# Patient Record
Sex: MALE | Race: WHITE | NOT HISPANIC OR LATINO | Employment: FULL TIME | URBAN - METROPOLITAN AREA
[De-identification: names, ages, dates, MRNs, and addresses within clinical notes are randomized per-mention and may not be internally consistent; named-entity substitution may affect disease eponyms.]

---

## 2018-03-07 RX ORDER — AMLODIPINE BESYLATE 10 MG/1
10 TABLET ORAL DAILY
COMMUNITY
End: 2018-08-06 | Stop reason: SDUPTHER

## 2018-03-07 RX ORDER — GLUCOSAM/CHONDRO/HERB 149/HYAL 750-100 MG
1 TABLET ORAL DAILY
COMMUNITY
End: 2018-08-06

## 2018-03-07 RX ORDER — IBUPROFEN 200 MG
400 TABLET ORAL EVERY 6 HOURS PRN
COMMUNITY
End: 2018-03-12

## 2018-03-07 RX ORDER — METOPROLOL SUCCINATE 100 MG/1
100 TABLET, EXTENDED RELEASE ORAL DAILY
COMMUNITY
End: 2018-08-06 | Stop reason: SDUPTHER

## 2018-03-07 RX ORDER — METFORMIN HYDROCHLORIDE 500 MG/1
500 TABLET ORAL 2 TIMES DAILY WITH MEALS
COMMUNITY
End: 2018-08-06

## 2018-03-07 RX ORDER — SPIRONOLACTONE 50 MG/1
50 TABLET, FILM COATED ORAL 2 TIMES DAILY
COMMUNITY
End: 2018-07-03 | Stop reason: SDUPTHER

## 2018-03-07 RX ORDER — ATORVASTATIN CALCIUM 40 MG/1
40 TABLET, FILM COATED ORAL DAILY
COMMUNITY
End: 2018-03-12

## 2018-03-07 RX ORDER — VALSARTAN AND HYDROCHLOROTHIAZIDE 320; 25 MG/1; MG/1
1 TABLET, FILM COATED ORAL DAILY
COMMUNITY
End: 2018-05-03 | Stop reason: SDUPTHER

## 2018-03-12 ENCOUNTER — OFFICE VISIT (OUTPATIENT)
Dept: INTERNAL MEDICINE | Facility: CLINIC | Age: 63
End: 2018-03-12
Payer: COMMERCIAL

## 2018-03-12 ENCOUNTER — TELEPHONE (OUTPATIENT)
Dept: INTERNAL MEDICINE | Facility: CLINIC | Age: 63
End: 2018-03-12

## 2018-03-12 VITALS
HEIGHT: 63 IN | DIASTOLIC BLOOD PRESSURE: 84 MMHG | SYSTOLIC BLOOD PRESSURE: 138 MMHG | WEIGHT: 234.38 LBS | TEMPERATURE: 97 F | RESPIRATION RATE: 16 BRPM | OXYGEN SATURATION: 97 % | BODY MASS INDEX: 41.53 KG/M2 | HEART RATE: 69 BPM

## 2018-03-12 DIAGNOSIS — E78.00 PURE HYPERCHOLESTEROLEMIA: ICD-10-CM

## 2018-03-12 DIAGNOSIS — Z12.11 ENCOUNTER FOR SCREENING COLONOSCOPY: ICD-10-CM

## 2018-03-12 DIAGNOSIS — Z11.59 NEED FOR HEPATITIS C SCREENING TEST: ICD-10-CM

## 2018-03-12 DIAGNOSIS — R97.20 RAISED PROSTATE SPECIFIC ANTIGEN: ICD-10-CM

## 2018-03-12 DIAGNOSIS — E11.9 TYPE 2 DIABETES MELLITUS WITHOUT COMPLICATION, WITHOUT LONG-TERM CURRENT USE OF INSULIN: Primary | ICD-10-CM

## 2018-03-12 DIAGNOSIS — F17.200 TOBACCO DEPENDENCE: ICD-10-CM

## 2018-03-12 DIAGNOSIS — I10 ESSENTIAL HYPERTENSION: ICD-10-CM

## 2018-03-12 PROBLEM — E78.5 HYPERLIPIDEMIA: Status: ACTIVE | Noted: 2018-03-12

## 2018-03-12 PROBLEM — I25.10 CAD (CORONARY ARTERY DISEASE): Status: ACTIVE | Noted: 2018-03-12

## 2018-03-12 PROCEDURE — 99214 OFFICE O/P EST MOD 30 MIN: CPT | Mod: ,,, | Performed by: INTERNAL MEDICINE

## 2018-03-12 RX ORDER — VARENICLINE TARTRATE 1 MG/1
1 TABLET, FILM COATED ORAL 2 TIMES DAILY
Qty: 60 TABLET | Refills: 3 | Status: SHIPPED | OUTPATIENT
Start: 2018-04-11 | End: 2018-08-06

## 2018-03-12 RX ORDER — VARENICLINE TARTRATE 0.5 (11)-1
KIT ORAL
Qty: 1 PACKAGE | Refills: 0 | Status: SHIPPED | OUTPATIENT
Start: 2018-03-12 | End: 2018-08-06

## 2018-03-12 RX ORDER — ATORVASTATIN CALCIUM 40 MG/1
40 TABLET, FILM COATED ORAL DAILY
COMMUNITY
Start: 2017-07-10 | End: 2018-08-06 | Stop reason: SDUPTHER

## 2018-03-12 NOTE — PROGRESS NOTES
Subjective:       Patient ID: Kash Salomon is a 62 y.o. male.    Chief Complaint: Diabetes (3 month follow up); Hypertension (3 month follow up); and Hyperlipidemia (3 month follow up)    Diabetes   He presents for his follow-up diabetic visit. He has type 2 diabetes mellitus. His disease course has been stable. Pertinent negatives for hypoglycemia include no confusion, dizziness, headaches, mood changes, nervousness/anxiousness, seizures, sleepiness, speech difficulty or tremors. Pertinent negatives for diabetes include no blurred vision, no chest pain, no fatigue, no foot paresthesias, no foot ulcerations, no polydipsia, no polyphagia, no polyuria, no visual change and no weight loss. There are no hypoglycemic complications. Symptoms are stable. There are no diabetic complications. Pertinent negatives for diabetic complications include no CVA. Current diabetic treatment includes oral agent (monotherapy). He is compliant with treatment all of the time. His weight is stable. Diabetic current diet: works as a  so diet difficult to maintain. He rarely participates in exercise. There is no change (only checks every few weeks) in his home blood glucose trend. An ACE inhibitor/angiotensin II receptor blocker is being taken. He does not see a podiatrist.Eye exam is not current.   Hypertension   This is a chronic problem. The problem is controlled. Pertinent negatives include no anxiety, blurred vision, chest pain, headaches, malaise/fatigue, neck pain, orthopnea, palpitations, peripheral edema, PND or shortness of breath. Past treatments include beta blockers, angiotensin blockers and diuretics. The current treatment provides moderate improvement. There are no compliance problems.  Hypertensive end-organ damage includes CAD/MI. There is no history of angina or CVA.     Review of Systems   Constitutional: Negative for activity change, appetite change, chills, diaphoresis, fatigue, fever, malaise/fatigue,  unexpected weight change and weight loss.   HENT: Positive for rhinorrhea. Negative for congestion, ear discharge, ear pain, postnasal drip, sinus pressure, sneezing, sore throat and voice change.    Eyes: Negative for blurred vision, photophobia, pain, discharge, redness, itching and visual disturbance.   Respiratory: Negative for apnea, cough, choking, chest tightness, shortness of breath and wheezing.    Cardiovascular: Negative for chest pain, palpitations, orthopnea, leg swelling and PND.   Gastrointestinal: Negative for abdominal distention, abdominal pain, blood in stool, constipation, diarrhea, nausea and vomiting.   Endocrine: Negative for polydipsia, polyphagia and polyuria.   Genitourinary: Negative for decreased urine volume, difficulty urinating, discharge, dysuria, enuresis, flank pain, frequency, genital sores, hematuria, scrotal swelling, testicular pain and urgency.   Musculoskeletal: Positive for arthralgias. Negative for back pain, gait problem, joint swelling, neck pain and neck stiffness.   Skin: Negative for color change and rash.   Neurological: Negative for dizziness, tremors, seizures, facial asymmetry, speech difficulty, light-headedness, numbness and headaches.   Hematological: Negative for adenopathy.   Psychiatric/Behavioral: Negative for behavioral problems, confusion, decreased concentration and hallucinations. The patient is not nervous/anxious.        Past Medical History:   Diagnosis Date    Chronic ischemic heart disease     Cyst, pilonidal, with abscess     Diabetes mellitus, type 2     Hyperlipidemia     Hypertension     Raised prostate specific antigen       Past Surgical History:   Procedure Laterality Date    CORONARY ARTERY BYPASS GRAFT      HERNIA REPAIR      right elbow         Family History   Problem Relation Age of Onset    Osteoporosis Mother     Arthritis Mother     Cancer Father     Scoliosis Daughter        Social History     Social History    Marital  status:      Spouse name: N/A    Number of children: N/A    Years of education: N/A     Social History Main Topics    Smoking status: Current Every Day Smoker    Smokeless tobacco: Never Used      Comment: 1 pack per week    Alcohol use No    Drug use: No    Sexual activity: Not Asked     Other Topics Concern    None     Social History Narrative    None       Current Outpatient Prescriptions   Medication Sig Dispense Refill    amLODIPine (NORVASC) 10 MG tablet Take 10 mg by mouth once daily.      atorvastatin (LIPITOR) 40 MG tablet Take 40 mg by mouth Daily.      metFORMIN (GLUCOPHAGE) 500 MG tablet Take 500 mg by mouth 2 (two) times daily with meals.      metoprolol succinate (TOPROL-XL) 100 MG 24 hr tablet Take 100 mg by mouth once daily.      omega 3-dha-epa-fish oil (FISH OIL) 1,000 mg (120 mg-180 mg) Cap Take 1 capsule by mouth once daily.      spironolactone (ALDACTONE) 50 MG tablet Take 50 mg by mouth 2 (two) times daily.      valsartan-hydrochlorothiazide (DIOVAN-HCT) 320-25 mg per tablet Take 1 tablet by mouth once daily.      varenicline (CHANTIX STARTING MONTH BOX) 0.5 mg (11)- 1 mg (42) tablet Take one 0.5mg tab by mouth once daily X3 days,then increase to one 0.5mg tab twice daily X4 days,then increase to one 1mg tab twice daily 1 Package 0    [START ON 4/11/2018] varenicline (CHANTIX) 1 mg Tab Take 1 tablet (1 mg total) by mouth 2 (two) times daily. 60 tablet 3     No current facility-administered medications for this visit.        Review of patient's allergies indicates:  No Known Allergies  Objective:    HPI     Diabetes    Additional comments: 3 month follow up           Hypertension    Additional comments: 3 month follow up           Hyperlipidemia    Additional comments: 3 month follow up       Last edited by Cyn Smith MA on 3/12/2018  8:25 AM. (History)      Blood pressure 138/84, pulse 69, temperature 97.3 °F (36.3 °C), temperature source Temporal, resp. rate 16,  "height 5' 3" (1.6 m), weight 106.3 kg (234 lb 6.4 oz), SpO2 97 %. Body mass index is 41.52 kg/m².   Physical Exam   Constitutional: He appears well-developed. No distress.   HENT:   Nose: Nose normal.   Mouth/Throat: Oropharynx is clear and moist. Abnormal dentition (missing multiple teeth).   Eyes: Conjunctivae are normal. Right eye exhibits no discharge. Left eye exhibits no discharge. No scleral icterus.   Neck: Carotid bruit is not present.   Cardiovascular: Normal rate, regular rhythm and normal heart sounds.    No murmur heard.  Pulses:       Dorsalis pedis pulses are 1+ on the right side, and 1+ on the left side.   Pulmonary/Chest: Effort normal and breath sounds normal. No respiratory distress. He has no decreased breath sounds. He has no wheezes. He has no rhonchi. He has no rales.   Abdominal: Soft. He exhibits no distension. There is no tenderness. There is no rebound and no guarding.   Musculoskeletal: He exhibits no edema.   Feet:   Right Foot:   Protective Sensation: 10 sites tested. 10 sites sensed.   Skin Integrity: Negative for ulcer, blister, skin breakdown, erythema, warmth, callus or dry skin.   Left Foot:   Protective Sensation: 10 sites tested. 10 sites sensed.   Skin Integrity: Negative for ulcer, blister, skin breakdown, erythema, warmth, callus or dry skin.   Neurological: He is alert. He displays no tremor.   Skin: Skin is warm and dry.   Psychiatric: He has a normal mood and affect. His speech is normal.   Nursing note and vitals reviewed.          Assessment:       1. Type 2 diabetes mellitus without complication, without long-term current use of insulin    2. Essential hypertension    3. Pure hypercholesterolemia    4. Raised prostate specific antigen    5. Need for hepatitis C screening test    6. Encounter for screening colonoscopy    7. Tobacco dependence        Plan:       Kash was seen today for diabetes, hypertension and hyperlipidemia.    Diagnoses and all orders for this " visit:    Type 2 diabetes mellitus without complication, without long-term current use of insulin  -     Microalbumin/creatinine urine ratio; Future  -     Urinalysis; Future  -     Lipid panel; Future  -     Comprehensive metabolic panel; Future  -     Hemoglobin A1c; Future  -     Ambulatory referral to Ophthalmology  -     Microalbumin/creatinine urine ratio  -     Urinalysis  -     Lipid panel  -     Comprehensive metabolic panel  -     Hemoglobin A1c    Essential hypertension    Pure hypercholesterolemia    Raised prostate specific antigen    Need for hepatitis C screening test  -     Hepatitis C antibody; Future  -     Hepatitis C antibody    Encounter for screening colonoscopy  -     Ambulatory consult to Gastroenterology    Tobacco dependence  -     varenicline (CHANTIX STARTING MONTH BOX) 0.5 mg (11)- 1 mg (42) tablet; Take one 0.5mg tab by mouth once daily X3 days,then increase to one 0.5mg tab twice daily X4 days,then increase to one 1mg tab twice daily  -     varenicline (CHANTIX) 1 mg Tab; Take 1 tablet (1 mg total) by mouth 2 (two) times daily.

## 2018-05-03 RX ORDER — VALSARTAN AND HYDROCHLOROTHIAZIDE 320; 25 MG/1; MG/1
TABLET, FILM COATED ORAL
Qty: 30 TABLET | Refills: 3 | Status: SHIPPED | OUTPATIENT
Start: 2018-05-03 | End: 2018-08-06 | Stop reason: ALTCHOICE

## 2018-07-03 RX ORDER — SPIRONOLACTONE 25 MG/1
50 TABLET ORAL 2 TIMES DAILY
Qty: 120 TABLET | Refills: 3 | Status: SHIPPED | OUTPATIENT
Start: 2018-07-03 | End: 2018-08-06 | Stop reason: SDUPTHER

## 2018-08-06 ENCOUNTER — OFFICE VISIT (OUTPATIENT)
Dept: INTERNAL MEDICINE | Facility: CLINIC | Age: 63
End: 2018-08-06

## 2018-08-06 VITALS
OXYGEN SATURATION: 95 % | TEMPERATURE: 98 F | SYSTOLIC BLOOD PRESSURE: 132 MMHG | BODY MASS INDEX: 35.01 KG/M2 | HEIGHT: 68 IN | HEART RATE: 75 BPM | WEIGHT: 231 LBS | DIASTOLIC BLOOD PRESSURE: 86 MMHG

## 2018-08-06 DIAGNOSIS — Z12.11 ENCOUNTER FOR SCREENING COLONOSCOPY: ICD-10-CM

## 2018-08-06 DIAGNOSIS — I10 ESSENTIAL HYPERTENSION: ICD-10-CM

## 2018-08-06 DIAGNOSIS — E78.00 PURE HYPERCHOLESTEROLEMIA: ICD-10-CM

## 2018-08-06 DIAGNOSIS — Z11.59 NEED FOR HEPATITIS C SCREENING TEST: ICD-10-CM

## 2018-08-06 DIAGNOSIS — E11.9 TYPE 2 DIABETES MELLITUS WITHOUT COMPLICATION, WITHOUT LONG-TERM CURRENT USE OF INSULIN: Primary | ICD-10-CM

## 2018-08-06 DIAGNOSIS — R97.20 ELEVATED PSA: ICD-10-CM

## 2018-08-06 LAB
BILIRUB SERPL-MCNC: NORMAL MG/DL
BLOOD, POC UA: NORMAL
GLUCOSE UR QL STRIP: NORMAL
HBA1C MFR BLD: 7.4 %
KETONES UR QL STRIP: NORMAL
LEUKOCYTE ESTERASE URINE, POC: NORMAL
NITRITE, POC UA: NORMAL
PH, POC UA: 5
PROTEIN, POC: NORMAL
SPECIFIC GRAVITY, POC UA: <=1.005
UROBILINOGEN, POC UA: NORMAL

## 2018-08-06 PROCEDURE — 99499 UNLISTED E&M SERVICE: CPT | Mod: 25,,, | Performed by: INTERNAL MEDICINE

## 2018-08-06 RX ORDER — ATORVASTATIN CALCIUM 40 MG/1
40 TABLET, FILM COATED ORAL DAILY
Qty: 90 TABLET | Refills: 1 | Status: SHIPPED | OUTPATIENT
Start: 2018-08-06 | End: 2018-11-02 | Stop reason: SDUPTHER

## 2018-08-06 RX ORDER — AMLODIPINE BESYLATE 10 MG/1
10 TABLET ORAL DAILY
Qty: 90 TABLET | Refills: 1 | Status: SHIPPED | OUTPATIENT
Start: 2018-08-06 | End: 2019-08-05 | Stop reason: SDUPTHER

## 2018-08-06 RX ORDER — OLMESARTAN MEDOXOMIL AND HYDROCHLOROTHIAZIDE 40/12.5 40; 12.5 MG/1; MG/1
1 TABLET ORAL DAILY
Qty: 90 TABLET | Refills: 1 | Status: SHIPPED | OUTPATIENT
Start: 2018-08-06 | End: 2019-08-05 | Stop reason: DRUGHIGH

## 2018-08-06 RX ORDER — METFORMIN HYDROCHLORIDE 500 MG/1
500 TABLET ORAL 2 TIMES DAILY WITH MEALS
Status: CANCELLED | OUTPATIENT
Start: 2018-08-06

## 2018-08-06 RX ORDER — VALSARTAN AND HYDROCHLOROTHIAZIDE 320; 25 MG/1; MG/1
1 TABLET, FILM COATED ORAL DAILY
Qty: 30 TABLET | Refills: 3 | Status: CANCELLED | OUTPATIENT
Start: 2018-08-06

## 2018-08-06 RX ORDER — SPIRONOLACTONE 25 MG/1
50 TABLET ORAL 2 TIMES DAILY
Qty: 120 TABLET | Refills: 3 | Status: SHIPPED | OUTPATIENT
Start: 2018-08-06 | End: 2019-08-05 | Stop reason: SDUPTHER

## 2018-08-06 RX ORDER — METOPROLOL SUCCINATE 100 MG/1
100 TABLET, EXTENDED RELEASE ORAL DAILY
Qty: 90 TABLET | Refills: 1 | Status: SHIPPED | OUTPATIENT
Start: 2018-08-06 | End: 2019-08-05 | Stop reason: SDUPTHER

## 2018-08-06 RX ORDER — METFORMIN HYDROCHLORIDE EXTENDED-RELEASE TABLETS 1000 MG/1
1000 TABLET, FILM COATED, EXTENDED RELEASE ORAL DAILY
Qty: 90 TABLET | Refills: 1 | Status: SHIPPED | OUTPATIENT
Start: 2018-08-06 | End: 2019-06-06 | Stop reason: CLARIF

## 2018-08-06 NOTE — PROGRESS NOTES
Subjective:       Patient ID: Kash Salomon is a 62 y.o. male.    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  Never had labs done.       Hypertension   This is a chronic problem. The problem is controlled. Pertinent negatives include no anxiety, blurred vision, chest pain, headaches, malaise/fatigue, neck pain, orthopnea, palpitations, peripheral edema, PND or shortness of breath. Past treatments include beta blockers, angiotensin blockers and diuretics. The current treatment provides moderate improvement. There are no compliance problems.  Hypertensive end-organ damage includes CAD/MI (s/p CABG 05/2015). There is no history of angina or CVA.   Diabetes   He presents for his follow-up diabetic visit. He has type 2 diabetes mellitus. His disease course has been stable. Pertinent negatives for hypoglycemia include no confusion, dizziness, headaches, mood changes, nervousness/anxiousness, seizures, sleepiness, speech difficulty or tremors. Pertinent negatives for diabetes include no blurred vision, no chest pain, no fatigue, no foot paresthesias, no foot ulcerations, no polydipsia, no polyphagia, no polyuria, no visual change, no weakness and no weight loss. There are no hypoglycemic complications. Symptoms are stable. There are no diabetic complications. Pertinent negatives for diabetic complications include no CVA. Current diabetic treatment includes oral agent (monotherapy). He is compliant with treatment some of the time (has only been taking one metformin per day and usually at night because he sometimes has diarrhea with it which is an issue as a ). His weight is stable. Diabetic current diet: works as a  so diet difficult to maintain. He rarely participates in exercise. There is no change in his home blood glucose trend. An ACE inhibitor/angiotensin II receptor blocker is being taken. He does not  see a podiatrist.Eye exam is current.     Review of Systems   Constitutional: Negative for chills, fatigue, fever, malaise/fatigue, unexpected weight change and weight loss.   HENT: Positive for congestion, postnasal drip, rhinorrhea and sinus pressure. Negative for hearing loss, trouble swallowing and voice change.    Eyes: Negative for blurred vision, photophobia and visual disturbance.   Respiratory: Negative for apnea, cough, choking, chest tightness, shortness of breath and wheezing.    Cardiovascular: Negative for chest pain, palpitations, orthopnea, leg swelling and PND.   Gastrointestinal: Negative for abdominal pain, blood in stool, constipation, diarrhea, nausea, rectal pain and vomiting.   Endocrine: Negative for cold intolerance, heat intolerance, polydipsia, polyphagia and polyuria.   Genitourinary: Negative for decreased urine volume, difficulty urinating, discharge, dysuria, flank pain, frequency, genital sores, hematuria, testicular pain and urgency.   Musculoskeletal: Negative for arthralgias, back pain, gait problem, joint swelling, myalgias and neck pain.   Skin: Negative for color change, rash and wound.   Allergic/Immunologic: Negative for environmental allergies and food allergies.   Neurological: Negative for dizziness, tremors, seizures, syncope, facial asymmetry, speech difficulty, weakness, light-headedness, numbness and headaches.   Hematological: Negative for adenopathy. Does not bruise/bleed easily.   Psychiatric/Behavioral: Negative for confusion, hallucinations, sleep disturbance and suicidal ideas. The patient is not nervous/anxious.        Past Medical History:   Diagnosis Date    Chronic ischemic heart disease     Cyst, pilonidal, with abscess     Diabetes mellitus, type 2     Hyperlipidemia     Hypertension     Raised prostate specific antigen       Past Surgical History:   Procedure Laterality Date    CORONARY ARTERY BYPASS GRAFT  05/2015    HERNIA REPAIR      right elbow  "        Family History   Problem Relation Age of Onset    Osteoporosis Mother     Arthritis Mother     Cancer Father     Scoliosis Daughter        Social History     Social History    Marital status:      Spouse name: N/A    Number of children: N/A    Years of education: N/A     Occupational History          Social History Main Topics    Smoking status: Current Every Day Smoker    Smokeless tobacco: Never Used      Comment: 1 pack per week    Alcohol use No    Drug use: No    Sexual activity: Not Asked     Other Topics Concern    None     Social History Narrative    LIVE WITH WIFE       Current Outpatient Prescriptions   Medication Sig Dispense Refill    amLODIPine (NORVASC) 10 MG tablet Take 1 tablet (10 mg total) by mouth once daily. 90 tablet 1    atorvastatin (LIPITOR) 40 MG tablet Take 1 tablet (40 mg total) by mouth Daily. 90 tablet 1    metoprolol succinate (TOPROL-XL) 100 MG 24 hr tablet Take 1 tablet (100 mg total) by mouth once daily. 90 tablet 1    spironolactone (ALDACTONE) 25 MG tablet Take 2 tablets (50 mg total) by mouth 2 (two) times daily. 120 tablet 3    metFORMIN (FORTAMET) 1,000 mg 24 hr tablet Take 1 tablet (1,000 mg total) by mouth once daily. 90 tablet 1    olmesartan-hydrochlorothiazide (BENICAR HCT) 40-12.5 mg Tab Take 1 tablet by mouth once daily. 90 tablet 1     No current facility-administered medications for this visit.        Review of patient's allergies indicates:  No Known Allergies  Objective:    HPI     Hypertension    Additional comments: CONTINUITY OF  CARE           Annual Exam    Additional comments: DOT PHYSICAL       Last edited by Radha Zuniga MA on 8/6/2018  9:37 AM. (History)      Blood pressure 132/86, pulse 75, temperature 98.1 °F (36.7 °C), temperature source Temporal, height 5' 3" (1.6 m), weight 104.8 kg (231 lb), SpO2 95 %. Body mass index is 40.92 kg/m².   Physical Exam   Constitutional: He appears well-developed. No " distress.   HENT:   Right Ear: Hearing normal.   Left Ear: Hearing normal.   Nose: Nose normal.   Mouth/Throat: Oropharynx is clear and moist. Abnormal dentition (missing multiple teeth).   Eyes: Conjunctivae are normal. Right eye exhibits no discharge. Left eye exhibits no discharge. No scleral icterus.   Neck: Carotid bruit is not present.   Cardiovascular: Normal rate, regular rhythm and normal heart sounds.    No murmur heard.  Pulses:       Dorsalis pedis pulses are 1+ on the right side, and 1+ on the left side.   Pulmonary/Chest: Effort normal and breath sounds normal. No respiratory distress. He has no decreased breath sounds. He has no wheezes. He has no rhonchi. He has no rales.   Abdominal: Soft. He exhibits no distension. There is no tenderness. There is no rebound and no guarding.   Musculoskeletal: He exhibits no edema.   Feet:   Right Foot:   Protective Sensation: 10 sites tested. 10 sites sensed.   Skin Integrity: Negative for ulcer, blister, skin breakdown, erythema, warmth, callus or dry skin.   Left Foot:   Protective Sensation: 10 sites tested. 10 sites sensed.   Skin Integrity: Negative for ulcer, blister, skin breakdown, erythema, warmth, callus or dry skin.   Neurological: He is alert. He has normal reflexes. He displays no tremor.   Skin: Skin is warm and dry.   Psychiatric: He has a normal mood and affect. His speech is normal.   Nursing note and vitals reviewed.          Assessment:       1. Type 2 diabetes mellitus without complication, without long-term current use of insulin    2. Essential hypertension    3. Pure hypercholesterolemia    4. Need for hepatitis C screening test    5. Encounter for screening colonoscopy    6. Elevated PSA        Plan:       Kash was seen today for hypertension, diabetes and annual exam.    Diagnoses and all orders for this visit:    Type 2 diabetes mellitus without complication, without long-term current use of insulin  Comments:  Will switch on metformin  ER which often has less GI side effects  Orders:  -     Hemoglobin A1C, POCT  -     Comprehensive metabolic panel; Future  -     Lipid panel; Future  -     Microalbumin/creatinine urine ratio; Future  -     metFORMIN (FORTAMET) 1,000 mg 24 hr tablet; Take 1 tablet (1,000 mg total) by mouth once daily.  -     Comprehensive metabolic panel  -     Lipid panel  -     Microalbumin/creatinine urine ratio    Essential hypertension  Comments:  Will switch ARBs d/t diovan recall    Orders:  -     amLODIPine (NORVASC) 10 MG tablet; Take 1 tablet (10 mg total) by mouth once daily.  -     metoprolol succinate (TOPROL-XL) 100 MG 24 hr tablet; Take 1 tablet (100 mg total) by mouth once daily.  -     spironolactone (ALDACTONE) 25 MG tablet; Take 2 tablets (50 mg total) by mouth 2 (two) times daily.  -     POCT Urinalysis  -     olmesartan-hydrochlorothiazide (BENICAR HCT) 40-12.5 mg Tab; Take 1 tablet by mouth once daily.    Pure hypercholesterolemia  -     atorvastatin (LIPITOR) 40 MG tablet; Take 1 tablet (40 mg total) by mouth Daily.    Need for hepatitis C screening test  -     Hepatitis C antibody; Future  -     Hepatitis C antibody    Encounter for screening colonoscopy  -     Ambulatory consult to Gastroenterology    Elevated PSA  -     Prostate Specific Antigen, Diagnostic; Future  -     Prostate Specific Antigen, Diagnostic    Other orders  -     Cancel: metFORMIN (GLUCOPHAGE) 500 MG tablet; Take 1 tablet (500 mg total) by mouth 2 (two) times daily with meals.  -     Cancel: valsartan-hydrochlorothiazide (DIOVAN-HCT) 320-25 mg per tablet; Take 1 tablet by mouth once daily.

## 2018-08-07 ENCOUNTER — TELEPHONE (OUTPATIENT)
Dept: INTERNAL MEDICINE | Facility: CLINIC | Age: 63
End: 2018-08-07

## 2018-08-07 LAB
ALBUMIN SERPL-MCNC: 4.4 G/DL (ref 3.6–4.8)
ALBUMIN/GLOB SERPL: 1.4 {RATIO} (ref 1.2–2.2)
ALP SERPL-CCNC: 89 IU/L (ref 39–117)
ALT SERPL-CCNC: 33 IU/L (ref 0–44)
AST SERPL-CCNC: 24 IU/L (ref 0–40)
BILIRUB SERPL-MCNC: 0.6 MG/DL (ref 0–1.2)
BUN SERPL-MCNC: 26 MG/DL (ref 8–27)
BUN/CREAT SERPL: 16 (ref 10–24)
CALCIUM SERPL-MCNC: 9.3 MG/DL (ref 8.6–10.2)
CHLORIDE SERPL-SCNC: 101 MMOL/L (ref 96–106)
CHOLEST SERPL-MCNC: 104 MG/DL (ref 100–199)
CO2 SERPL-SCNC: 22 MMOL/L (ref 20–29)
CREAT SERPL-MCNC: 1.64 MG/DL (ref 0.76–1.27)
EGFR IF AFRICAN AMERICAN: 52 ML/MIN/1.73
EST. GFR  (NON AFRICAN AMERICAN): 45 ML/MIN/1.73
GLOBULIN SER CALC-MCNC: 3.2 G/DL (ref 1.5–4.5)
GLUCOSE SERPL-MCNC: 150 MG/DL (ref 65–99)
HCV AB S/CO SERPL IA: 0.1 S/CO RATIO (ref 0–0.9)
HDLC SERPL-MCNC: 24 MG/DL
LDLC SERPL CALC-MCNC: 61 MG/DL (ref 0–99)
POTASSIUM SERPL-SCNC: 5.3 MMOL/L (ref 3.5–5.2)
PROT SERPL-MCNC: 7.6 G/DL (ref 6–8.5)
PSA SERPL-MCNC: 9.7 NG/ML (ref 0–4)
SODIUM SERPL-SCNC: 136 MMOL/L (ref 134–144)
TRIGL SERPL-MCNC: 96 MG/DL (ref 0–149)
VLDLC SERPL CALC-MCNC: 19 MG/DL (ref 5–40)

## 2018-08-07 NOTE — TELEPHONE ENCOUNTER
----- Message from Wagner Ann Jr., MD sent at 8/7/2018  8:27 AM CDT -----  Please call the patient regarding his abnormal result.  PSA still trending upward.  Recheck every 6 months.  He probably should make f/u with Urology also.  Make sure he isn't taking any potassium supplements or using salt substitutes.

## 2018-11-02 DIAGNOSIS — E78.00 PURE HYPERCHOLESTEROLEMIA: ICD-10-CM

## 2018-11-05 RX ORDER — ATORVASTATIN CALCIUM 40 MG/1
TABLET, FILM COATED ORAL
Qty: 90 TABLET | Refills: 0 | Status: SHIPPED | OUTPATIENT
Start: 2018-11-05 | End: 2019-08-05 | Stop reason: SDUPTHER

## 2019-06-03 ENCOUNTER — TELEPHONE (OUTPATIENT)
Dept: FAMILY MEDICINE | Facility: CLINIC | Age: 64
End: 2019-06-03

## 2019-06-03 RX ORDER — METFORMIN HYDROCHLORIDE 500 MG/1
TABLET ORAL
Qty: 180 TABLET | Refills: 0 | OUTPATIENT
Start: 2019-06-03

## 2019-06-03 NOTE — TELEPHONE ENCOUNTER
The following medication needs a prior authorization:     Medication Name: metformin er    Dosage: 1,000 mg    Frequency: take one tablet daily    Directions for use: take one tablet daily.    Diagnosis:     Is the request for a reauthorization? New therapy    Is the patient currently stable on therapy? New therapy    Please list all therapeutic alternatives previously used with start/end dates and outcome:  Metformin 500 mg two times daily

## 2019-06-03 NOTE — TELEPHONE ENCOUNTER
----- Message from Yasemin Overton sent at 6/2/2019 12:28 PM CDT -----  PRIOR AUTHORIZATION, 6/1/19

## 2019-06-05 ENCOUNTER — TELEPHONE (OUTPATIENT)
Dept: FAMILY MEDICINE | Facility: CLINIC | Age: 64
End: 2019-06-05

## 2019-06-06 ENCOUNTER — TELEPHONE (OUTPATIENT)
Dept: FAMILY MEDICINE | Facility: CLINIC | Age: 64
End: 2019-06-06

## 2019-06-06 DIAGNOSIS — E11.9 TYPE 2 DIABETES MELLITUS WITHOUT COMPLICATION, WITHOUT LONG-TERM CURRENT USE OF INSULIN: Primary | ICD-10-CM

## 2019-06-06 RX ORDER — METFORMIN HYDROCHLORIDE 500 MG/1
1000 TABLET, EXTENDED RELEASE ORAL
Qty: 180 TABLET | Refills: 1 | Status: SHIPPED | OUTPATIENT
Start: 2019-06-06 | End: 2019-08-05 | Stop reason: SDUPTHER

## 2019-06-06 NOTE — TELEPHONE ENCOUNTER
----- Message from Wagner Ann Jr., MD sent at 6/6/2019 10:39 AM CDT -----  Insurance didn't cover the long acting metformin that I sent so I am trying a different one

## 2019-06-25 ENCOUNTER — TELEPHONE (OUTPATIENT)
Dept: FAMILY MEDICINE | Facility: CLINIC | Age: 64
End: 2019-06-25

## 2019-06-25 NOTE — TELEPHONE ENCOUNTER
HM UPDATE: LEFT MESSAGE FOR PATIENT TO CALL CLINIC ABOUT ANTITHROMBOIC  WITH ISCHEMIC       VASCULAR DISEASE, ASPIRIN THERAPY AND DIABETIC EYE SCREENING.

## 2019-08-02 DIAGNOSIS — E78.00 PURE HYPERCHOLESTEROLEMIA: ICD-10-CM

## 2019-08-02 DIAGNOSIS — I10 ESSENTIAL HYPERTENSION: ICD-10-CM

## 2019-08-05 ENCOUNTER — OFFICE VISIT (OUTPATIENT)
Dept: FAMILY MEDICINE | Facility: CLINIC | Age: 64
End: 2019-08-05
Payer: COMMERCIAL

## 2019-08-05 ENCOUNTER — TELEPHONE (OUTPATIENT)
Dept: FAMILY MEDICINE | Facility: CLINIC | Age: 64
End: 2019-08-05

## 2019-08-05 VITALS
OXYGEN SATURATION: 95 % | DIASTOLIC BLOOD PRESSURE: 82 MMHG | SYSTOLIC BLOOD PRESSURE: 152 MMHG | TEMPERATURE: 99 F | BODY MASS INDEX: 34.25 KG/M2 | HEART RATE: 78 BPM | RESPIRATION RATE: 14 BRPM | HEIGHT: 68 IN | WEIGHT: 226 LBS

## 2019-08-05 DIAGNOSIS — Z02.4 ENCOUNTER FOR DRIVER'S LICENSE HISTORY AND PHYSICAL: ICD-10-CM

## 2019-08-05 DIAGNOSIS — E78.00 PURE HYPERCHOLESTEROLEMIA: ICD-10-CM

## 2019-08-05 DIAGNOSIS — I10 ESSENTIAL HYPERTENSION: ICD-10-CM

## 2019-08-05 DIAGNOSIS — E11.9 TYPE 2 DIABETES MELLITUS WITHOUT COMPLICATION, WITHOUT LONG-TERM CURRENT USE OF INSULIN: ICD-10-CM

## 2019-08-05 DIAGNOSIS — I25.10 CORONARY ARTERY DISEASE, ANGINA PRESENCE UNSPECIFIED, UNSPECIFIED VESSEL OR LESION TYPE, UNSPECIFIED WHETHER NATIVE OR TRANSPLANTED HEART: ICD-10-CM

## 2019-08-05 DIAGNOSIS — E11.9 TYPE 2 DIABETES MELLITUS WITHOUT COMPLICATION, WITHOUT LONG-TERM CURRENT USE OF INSULIN: Primary | ICD-10-CM

## 2019-08-05 DIAGNOSIS — R97.20 RAISED PROSTATE SPECIFIC ANTIGEN: ICD-10-CM

## 2019-08-05 LAB — HBA1C MFR BLD: ABNORMAL %

## 2019-08-05 PROCEDURE — 83036 POCT HEMOGLOBIN A1C: ICD-10-PCS | Mod: QW,,, | Performed by: INTERNAL MEDICINE

## 2019-08-05 PROCEDURE — 99214 PR OFFICE/OUTPT VISIT, EST, LEVL IV, 30-39 MIN: ICD-10-PCS | Mod: S$GLB,,, | Performed by: INTERNAL MEDICINE

## 2019-08-05 PROCEDURE — 99214 OFFICE O/P EST MOD 30 MIN: CPT | Mod: S$GLB,,, | Performed by: INTERNAL MEDICINE

## 2019-08-05 PROCEDURE — 83036 HEMOGLOBIN GLYCOSYLATED A1C: CPT | Mod: QW,,, | Performed by: INTERNAL MEDICINE

## 2019-08-05 RX ORDER — SPIRONOLACTONE 50 MG/1
50 TABLET, FILM COATED ORAL DAILY
Qty: 90 TABLET | Refills: 1 | Status: SHIPPED | OUTPATIENT
Start: 2019-08-05 | End: 2020-05-29

## 2019-08-05 RX ORDER — METFORMIN HYDROCHLORIDE 1000 MG/1
1 TABLET ORAL 2 TIMES DAILY
COMMUNITY
End: 2019-08-05 | Stop reason: ALTCHOICE

## 2019-08-05 RX ORDER — OLMESARTAN MEDOXOMIL AND HYDROCHLOROTHIAZIDE 40/12.5 40; 12.5 MG/1; MG/1
TABLET ORAL
Qty: 90 TABLET | Refills: 0 | OUTPATIENT
Start: 2019-08-05

## 2019-08-05 RX ORDER — ASPIRIN 325 MG
325 TABLET ORAL DAILY
COMMUNITY

## 2019-08-05 RX ORDER — METFORMIN HYDROCHLORIDE 500 MG/1
1000 TABLET, EXTENDED RELEASE ORAL
Qty: 180 TABLET | Refills: 1 | Status: SHIPPED | OUTPATIENT
Start: 2019-08-05 | End: 2020-03-18 | Stop reason: ALTCHOICE

## 2019-08-05 RX ORDER — OLMESARTAN MEDOXOMIL AND HYDROCHLOROTHIAZIDE 40/25 40; 25 MG/1; MG/1
1 TABLET ORAL DAILY
Qty: 90 TABLET | Refills: 1 | Status: SHIPPED | OUTPATIENT
Start: 2019-08-05 | End: 2020-05-29

## 2019-08-05 RX ORDER — ATORVASTATIN CALCIUM 40 MG/1
40 TABLET, FILM COATED ORAL DAILY
Qty: 90 TABLET | Refills: 1 | Status: SHIPPED | OUTPATIENT
Start: 2019-08-05 | End: 2020-05-29

## 2019-08-05 RX ORDER — METOPROLOL SUCCINATE 100 MG/1
100 TABLET, EXTENDED RELEASE ORAL DAILY
Qty: 90 TABLET | Refills: 1 | Status: SHIPPED | OUTPATIENT
Start: 2019-08-05 | End: 2020-05-29

## 2019-08-05 RX ORDER — METOPROLOL SUCCINATE 100 MG/1
TABLET, EXTENDED RELEASE ORAL
Qty: 90 TABLET | Refills: 0 | OUTPATIENT
Start: 2019-08-05

## 2019-08-05 RX ORDER — ATORVASTATIN CALCIUM 40 MG/1
TABLET, FILM COATED ORAL
Qty: 90 TABLET | Refills: 0 | OUTPATIENT
Start: 2019-08-05

## 2019-08-05 RX ORDER — AMLODIPINE BESYLATE 10 MG/1
10 TABLET ORAL NIGHTLY
Qty: 90 TABLET | Refills: 1 | Status: SHIPPED | OUTPATIENT
Start: 2019-08-05 | End: 2020-07-20 | Stop reason: SDUPTHER

## 2019-08-05 NOTE — PROGRESS NOTES
Subjective:       Patient ID: Kash Salomon is a 63 y.o. male.    Chief Complaint: Annual Exam (DOT physical); Diabetes; and Hypertension    Here for routine f/u and DOT physical. Hasn't had labs since last year.    Hypertension   This is a chronic problem. Condition status: 120s-130s/70s-80s at home. Pertinent negatives include no anxiety, blurred vision, chest pain, headaches, malaise/fatigue, neck pain, orthopnea, palpitations, peripheral edema, PND or shortness of breath. Past treatments include beta blockers, angiotensin blockers, diuretics and calcium channel blockers. The current treatment provides moderate improvement. Compliance problems include medication side effects (has only been taking aldactone once daily d/t excess urination).  Hypertensive end-organ damage includes CAD/MI (s/p CABG 05/2015). There is no history of angina or CVA.   Diabetes   He presents for his follow-up diabetic visit. He has type 2 diabetes mellitus. His disease course has been stable. Pertinent negatives for hypoglycemia include no confusion, dizziness, headaches, mood changes, nervousness/anxiousness, seizures, sleepiness, speech difficulty or tremors. Pertinent negatives for diabetes include no blurred vision, no chest pain, no fatigue, no foot paresthesias, no foot ulcerations, no polydipsia, no polyphagia, no polyuria, no visual change, no weakness and no weight loss. There are no hypoglycemic complications. Symptoms are stable. There are no diabetic complications. Pertinent negatives for diabetic complications include no CVA. Current diabetic treatment includes oral agent (monotherapy). He is compliant with treatment some of the time (has only been taking one metformin per day and usually at night because he sometimes has diarrhea with it which is an issue as a ). Weight trend: down 5 pounds. Diabetic current diet: works as a  so diet difficult to maintain. He rarely participates in exercise. He  monitors urine at home 1-2 x per week. There is no change in his home blood glucose trend. An ACE inhibitor/angiotensin II receptor blocker is being taken. He does not see a podiatrist.Eye exam is not current (he has appt scheduled).     Review of Systems   Constitutional: Negative for chills, fatigue, fever, malaise/fatigue, unexpected weight change and weight loss.   HENT: Negative for congestion, hearing loss, postnasal drip, rhinorrhea, trouble swallowing and voice change.    Eyes: Negative for blurred vision, photophobia and visual disturbance.   Respiratory: Negative for apnea, cough, choking, chest tightness, shortness of breath and wheezing.    Cardiovascular: Negative for chest pain, palpitations, orthopnea, leg swelling and PND.   Gastrointestinal: Negative for abdominal pain, blood in stool, constipation, diarrhea, nausea, rectal pain and vomiting.   Endocrine: Negative for cold intolerance, heat intolerance, polydipsia, polyphagia and polyuria.   Genitourinary: Negative for decreased urine volume, difficulty urinating, discharge, dysuria, flank pain, frequency, genital sores, hematuria, testicular pain and urgency.   Musculoskeletal: Positive for arthralgias. Negative for back pain, gait problem, joint swelling, myalgias and neck pain.   Skin: Negative for color change, rash and wound.   Allergic/Immunologic: Negative for environmental allergies and food allergies.   Neurological: Negative for dizziness, tremors, seizures, syncope, facial asymmetry, speech difficulty, weakness, light-headedness, numbness and headaches.   Hematological: Negative for adenopathy. Does not bruise/bleed easily.   Psychiatric/Behavioral: Negative for confusion, hallucinations, sleep disturbance and suicidal ideas. The patient is not nervous/anxious.        Past Medical History:   Diagnosis Date    Chronic ischemic heart disease     Cyst, pilonidal, with abscess     Diabetes mellitus, type 2     Hyperlipidemia      Hypertension     Raised prostate specific antigen       Past Surgical History:   Procedure Laterality Date    CORONARY ARTERY BYPASS GRAFT  05/2015    HERNIA REPAIR      right elbow         Family History   Problem Relation Age of Onset    Osteoporosis Mother     Arthritis Mother     Cancer Father     Scoliosis Daughter        Social History     Socioeconomic History    Marital status:      Spouse name: Not on file    Number of children: Not on file    Years of education: Not on file    Highest education level: Not on file   Occupational History    Occupation:    Social Needs    Financial resource strain: Not on file    Food insecurity:     Worry: Not on file     Inability: Not on file    Transportation needs:     Medical: Not on file     Non-medical: Not on file   Tobacco Use    Smoking status: Current Every Day Smoker    Smokeless tobacco: Never Used    Tobacco comment: 1 pack per week   Substance and Sexual Activity    Alcohol use: No    Drug use: No    Sexual activity: Not on file   Lifestyle    Physical activity:     Days per week: Not on file     Minutes per session: Not on file    Stress: Not on file   Relationships    Social connections:     Talks on phone: Not on file     Gets together: Not on file     Attends Islam service: Not on file     Active member of club or organization: Not on file     Attends meetings of clubs or organizations: Not on file     Relationship status: Not on file   Other Topics Concern    Not on file   Social History Narrative    LIVE WITH WIFE       Current Outpatient Medications   Medication Sig Dispense Refill    amLODIPine (NORVASC) 10 MG tablet Take 1 tablet (10 mg total) by mouth every evening. 90 tablet 1    aspirin 325 MG tablet Take 325 mg by mouth once daily.      atorvastatin (LIPITOR) 40 MG tablet Take 1 tablet (40 mg total) by mouth once daily. 90 tablet 1    metFORMIN (GLUCOPHAGE-XR) 500 MG 24 hr tablet Take 2 tablets  "(1,000 mg total) by mouth daily with breakfast. 180 tablet 1    metoprolol succinate (TOPROL-XL) 100 MG 24 hr tablet Take 1 tablet (100 mg total) by mouth once daily. 90 tablet 1    olmesartan-hydrochlorothiazide (BENICAR HCT) 40-25 mg per tablet Take 1 tablet by mouth once daily. 90 tablet 1    semaglutide (OZEMPIC) 0.25 mg or 0.5 mg(2 mg/1.5 mL) PnIj Inject 0.25 mg into the skin every 7 days for 60 days, THEN 0.5 mg every 7 days. 1.5 mL 3    spironolactone (ALDACTONE) 50 MG tablet Take 1 tablet (50 mg total) by mouth once daily. 90 tablet 1     No current facility-administered medications for this visit.        Review of patient's allergies indicates:  No Known Allergies  Objective:    HPI     Annual Exam      Additional comments: DOT physical          Last edited by Wagner Ann Jr., MD on 8/5/2019  2:17 PM. (History)      Blood pressure (!) 152/82, pulse 78, temperature 98.7 °F (37.1 °C), temperature source Oral, resp. rate 14, height 5' 8" (1.727 m), weight 102.5 kg (226 lb), SpO2 95 %. Body mass index is 34.36 kg/m².   Physical Exam   Constitutional: He appears well-developed. No distress.   HENT:   Right Ear: Hearing normal.   Left Ear: Hearing normal.   Nose: Nose normal.   Mouth/Throat: Oropharynx is clear and moist. Abnormal dentition (missing multiple teeth).   Eyes: Conjunctivae are normal. Right eye exhibits no discharge. Left eye exhibits no discharge. No scleral icterus.   Neck: Carotid bruit is not present.   Cardiovascular: Normal rate, regular rhythm and normal heart sounds.   No murmur heard.  Pulses:       Dorsalis pedis pulses are 1+ on the right side, and 1+ on the left side.   Pulmonary/Chest: Effort normal and breath sounds normal. No respiratory distress. He has no decreased breath sounds. He has no wheezes. He has no rhonchi. He has no rales.   Abdominal: Soft. He exhibits no distension. There is no tenderness. There is no rebound and no guarding.   Musculoskeletal: He exhibits no " edema.   Feet:   Right Foot:   Protective Sensation: 10 sites tested. 10 sites sensed.   Skin Integrity: Negative for ulcer, blister, skin breakdown, erythema, warmth, callus or dry skin.   Left Foot:   Protective Sensation: 10 sites tested. 10 sites sensed.   Skin Integrity: Negative for ulcer, blister, skin breakdown, erythema, warmth, callus or dry skin.   Neurological: He is alert. He has normal reflexes. He displays no tremor.   Skin: Skin is warm and dry.   Psychiatric: He has a normal mood and affect. His speech is normal.   Nursing note and vitals reviewed.          Assessment:       1. Type 2 diabetes mellitus without complication, without long-term current use of insulin    2. Essential hypertension    3. Coronary artery disease, angina presence unspecified, unspecified vessel or lesion type, unspecified whether native or transplanted heart    4. Encounter for 's license history and physical    5. Raised prostate specific antigen    6. Essential hypertension    7. Pure hypercholesterolemia    8. Type 2 diabetes mellitus without complication, without long-term current use of insulin        Plan:       Kash was seen today for annual exam, diabetes and hypertension.    Diagnoses and all orders for this visit:    Type 2 diabetes mellitus without complication, without long-term current use of insulin  Comments:  He needs to be more compliant with labs and follow up.    Orders:  -     Comprehensive metabolic panel; Future  -     Lipid panel; Future  -     Microalbumin/creatinine urine ratio; Future  -     Urinalysis; Future  -     Hemoglobin A1C, POCT  -     Comprehensive metabolic panel  -     Lipid panel  -     Microalbumin/creatinine urine ratio  -     Urinalysis  -     atorvastatin (LIPITOR) 40 MG tablet; Take 1 tablet (40 mg total) by mouth once daily.  -     metFORMIN (GLUCOPHAGE-XR) 500 MG 24 hr tablet; Take 2 tablets (1,000 mg total) by mouth daily with breakfast.  -     semaglutide (OZEMPIC) 0.25  mg or 0.5 mg(2 mg/1.5 mL) PnIj; Inject 0.25 mg into the skin every 7 days for 60 days, THEN 0.5 mg every 7 days.    Essential hypertension  -     amLODIPine (NORVASC) 10 MG tablet; Take 1 tablet (10 mg total) by mouth every evening.  -     metoprolol succinate (TOPROL-XL) 100 MG 24 hr tablet; Take 1 tablet (100 mg total) by mouth once daily.  -     spironolactone (ALDACTONE) 50 MG tablet; Take 1 tablet (50 mg total) by mouth once daily.  -     olmesartan-hydrochlorothiazide (BENICAR HCT) 40-25 mg per tablet; Take 1 tablet by mouth once daily.    Coronary artery disease, angina presence unspecified, unspecified vessel or lesion type, unspecified whether native or transplanted heart  Comments:  Will need annual stress test for DOT physical starting next year  Orders:  -     atorvastatin (LIPITOR) 40 MG tablet; Take 1 tablet (40 mg total) by mouth once daily.    Encounter for 's license history and physical    Raised prostate specific antigen  -     PSA, total and free; Future  -     PSA, total and free    Essential hypertension  Comments:  Will switch ARBs d/t diovan recall    Orders:  -     amLODIPine (NORVASC) 10 MG tablet; Take 1 tablet (10 mg total) by mouth every evening.  -     metoprolol succinate (TOPROL-XL) 100 MG 24 hr tablet; Take 1 tablet (100 mg total) by mouth once daily.  -     spironolactone (ALDACTONE) 50 MG tablet; Take 1 tablet (50 mg total) by mouth once daily.  -     olmesartan-hydrochlorothiazide (BENICAR HCT) 40-25 mg per tablet; Take 1 tablet by mouth once daily.    Pure hypercholesterolemia  -     atorvastatin (LIPITOR) 40 MG tablet; Take 1 tablet (40 mg total) by mouth once daily.    Type 2 diabetes mellitus without complication, without long-term current use of insulin  -     Comprehensive metabolic panel; Future  -     Lipid panel; Future  -     Microalbumin/creatinine urine ratio; Future  -     Urinalysis; Future  -     Hemoglobin A1C, POCT  -     Comprehensive metabolic panel  -      Lipid panel  -     Microalbumin/creatinine urine ratio  -     Urinalysis  -     atorvastatin (LIPITOR) 40 MG tablet; Take 1 tablet (40 mg total) by mouth once daily.  -     metFORMIN (GLUCOPHAGE-XR) 500 MG 24 hr tablet; Take 2 tablets (1,000 mg total) by mouth daily with breakfast.  -     semaglutide (OZEMPIC) 0.25 mg or 0.5 mg(2 mg/1.5 mL) PnIj; Inject 0.25 mg into the skin every 7 days for 60 days, THEN 0.5 mg every 7 days.

## 2019-08-20 ENCOUNTER — TELEPHONE (OUTPATIENT)
Dept: FAMILY MEDICINE | Facility: CLINIC | Age: 64
End: 2019-08-20

## 2019-08-20 LAB
ALBUMIN SERPL-MCNC: 4.5 G/DL (ref 3.6–4.8)
ALBUMIN/CREAT UR: 34.3 MG/G CREAT (ref 0–30)
ALBUMIN/GLOB SERPL: 1.5 {RATIO} (ref 1.2–2.2)
ALP SERPL-CCNC: 97 IU/L (ref 39–117)
ALT SERPL-CCNC: 34 IU/L (ref 0–44)
APPEARANCE UR: CLEAR
AST SERPL-CCNC: 22 IU/L (ref 0–40)
BACTERIA #/AREA URNS HPF: ABNORMAL /[HPF]
BILIRUB SERPL-MCNC: 0.4 MG/DL (ref 0–1.2)
BILIRUB UR QL STRIP: NEGATIVE
BUN SERPL-MCNC: 23 MG/DL (ref 8–27)
BUN/CREAT SERPL: 13 (ref 10–24)
CALCIUM SERPL-MCNC: 9.5 MG/DL (ref 8.6–10.2)
CASTS URNS MICRO: ABNORMAL
CASTS URNS QL MICRO: PRESENT /LPF
CHLORIDE SERPL-SCNC: 103 MMOL/L (ref 96–106)
CHOLEST SERPL-MCNC: 109 MG/DL (ref 100–199)
CO2 SERPL-SCNC: 20 MMOL/L (ref 20–29)
COLOR UR: YELLOW
CREAT SERPL-MCNC: 1.79 MG/DL (ref 0.76–1.27)
CREAT UR-MCNC: 211.8 MG/DL
EPI CELLS #/AREA URNS HPF: ABNORMAL /HPF (ref 0–10)
GLOBULIN SER CALC-MCNC: 3.1 G/DL (ref 1.5–4.5)
GLUCOSE SERPL-MCNC: 119 MG/DL (ref 65–99)
GLUCOSE UR QL: NEGATIVE
HDLC SERPL-MCNC: 24 MG/DL
HGB UR QL STRIP: NEGATIVE
KETONES UR QL STRIP: NEGATIVE
LDLC SERPL CALC-MCNC: 67 MG/DL (ref 0–99)
LEUKOCYTE ESTERASE UR QL STRIP: NEGATIVE
MICRO URNS: ABNORMAL
MICROALBUMIN UR-MCNC: 72.7 UG/ML
MUCOUS THREADS URNS QL MICRO: PRESENT
NITRITE UR QL STRIP: NEGATIVE
PH UR STRIP: 5.5 [PH] (ref 5–7.5)
POTASSIUM SERPL-SCNC: 4.5 MMOL/L (ref 3.5–5.2)
PROT SERPL-MCNC: 7.6 G/DL (ref 6–8.5)
PROT UR QL STRIP: ABNORMAL
PSA FREE MFR SERPL: 11.1 %
PSA FREE SERPL-MCNC: 1.09 NG/ML
PSA SERPL-MCNC: 9.8 NG/ML (ref 0–4)
RBC #/AREA URNS HPF: ABNORMAL /HPF (ref 0–2)
SODIUM SERPL-SCNC: 139 MMOL/L (ref 134–144)
SP GR UR: 1.02 (ref 1–1.03)
TRIGL SERPL-MCNC: 88 MG/DL (ref 0–149)
UROBILINOGEN UR STRIP-MCNC: 0.2 MG/DL (ref 0.2–1)
VLDLC SERPL CALC-MCNC: 18 MG/DL (ref 5–40)
WBC #/AREA URNS HPF: ABNORMAL /HPF (ref 0–5)

## 2019-08-20 NOTE — TELEPHONE ENCOUNTER
----- Message from Wagner Ann Jr., MD sent at 8/20/2019  1:10 PM CDT -----  Please call the patient regarding his abnormal result.  PSA still elevated but stable.  Other labs look okay

## 2019-11-04 ENCOUNTER — OFFICE VISIT (OUTPATIENT)
Dept: FAMILY MEDICINE | Facility: CLINIC | Age: 64
End: 2019-11-04
Payer: COMMERCIAL

## 2019-11-04 VITALS
BODY MASS INDEX: 35.46 KG/M2 | HEIGHT: 68 IN | OXYGEN SATURATION: 96 % | HEART RATE: 78 BPM | TEMPERATURE: 98 F | DIASTOLIC BLOOD PRESSURE: 70 MMHG | SYSTOLIC BLOOD PRESSURE: 114 MMHG | WEIGHT: 234 LBS

## 2019-11-04 DIAGNOSIS — I25.10 CORONARY ARTERY DISEASE, ANGINA PRESENCE UNSPECIFIED, UNSPECIFIED VESSEL OR LESION TYPE, UNSPECIFIED WHETHER NATIVE OR TRANSPLANTED HEART: ICD-10-CM

## 2019-11-04 DIAGNOSIS — E11.9 TYPE 2 DIABETES MELLITUS WITHOUT COMPLICATION, WITHOUT LONG-TERM CURRENT USE OF INSULIN: Primary | ICD-10-CM

## 2019-11-04 DIAGNOSIS — I10 ESSENTIAL HYPERTENSION: ICD-10-CM

## 2019-11-04 DIAGNOSIS — Z23 NEED FOR PROPHYLACTIC VACCINATION AND INOCULATION AGAINST INFLUENZA: ICD-10-CM

## 2019-11-04 DIAGNOSIS — R97.20 RAISED PROSTATE SPECIFIC ANTIGEN: ICD-10-CM

## 2019-11-04 PROCEDURE — 90471 IMMUNIZATION ADMIN: CPT | Mod: S$GLB,,, | Performed by: INTERNAL MEDICINE

## 2019-11-04 PROCEDURE — 90471 FLU VACCINE (QUAD) GREATER THAN OR EQUAL TO 3YO PRESERVATIVE FREE IM: ICD-10-PCS | Mod: S$GLB,,, | Performed by: INTERNAL MEDICINE

## 2019-11-04 PROCEDURE — 90686 FLU VACCINE (QUAD) GREATER THAN OR EQUAL TO 3YO PRESERVATIVE FREE IM: ICD-10-PCS | Mod: S$GLB,,, | Performed by: INTERNAL MEDICINE

## 2019-11-04 PROCEDURE — 90686 IIV4 VACC NO PRSV 0.5 ML IM: CPT | Mod: S$GLB,,, | Performed by: INTERNAL MEDICINE

## 2019-11-04 PROCEDURE — 99214 OFFICE O/P EST MOD 30 MIN: CPT | Mod: 25,S$GLB,, | Performed by: INTERNAL MEDICINE

## 2019-11-04 PROCEDURE — 99214 PR OFFICE/OUTPT VISIT, EST, LEVL IV, 30-39 MIN: ICD-10-PCS | Mod: 25,S$GLB,, | Performed by: INTERNAL MEDICINE

## 2019-11-04 RX ORDER — AMOXICILLIN 500 MG
2 CAPSULE ORAL DAILY
COMMUNITY

## 2019-11-20 ENCOUNTER — TELEPHONE (OUTPATIENT)
Dept: FAMILY MEDICINE | Facility: CLINIC | Age: 64
End: 2019-11-20

## 2019-11-20 NOTE — TELEPHONE ENCOUNTER
Bnonie at [facility] called and said that she has been trying to reach patient to schedule appt. For elevated psa and has not been able to contact him.

## 2020-01-06 ENCOUNTER — TELEPHONE (OUTPATIENT)
Dept: FAMILY MEDICINE | Facility: CLINIC | Age: 65
End: 2020-01-06

## 2020-03-14 LAB
ALBUMIN SERPL-MCNC: 4.2 G/DL (ref 3.8–4.8)
ALBUMIN/GLOB SERPL: 1.4 {RATIO} (ref 1.2–2.2)
ALP SERPL-CCNC: 144 IU/L (ref 39–117)
ALT SERPL-CCNC: 27 IU/L (ref 0–44)
AST SERPL-CCNC: 18 IU/L (ref 0–40)
BILIRUB SERPL-MCNC: 0.3 MG/DL (ref 0–1.2)
BUN SERPL-MCNC: 17 MG/DL (ref 8–27)
BUN/CREAT SERPL: 11 (ref 10–24)
CALCIUM SERPL-MCNC: 10 MG/DL (ref 8.6–10.2)
CHLORIDE SERPL-SCNC: 95 MMOL/L (ref 96–106)
CO2 SERPL-SCNC: 21 MMOL/L (ref 20–29)
CREAT SERPL-MCNC: 1.49 MG/DL (ref 0.76–1.27)
GLOBULIN SER CALC-MCNC: 3 G/DL (ref 1.5–4.5)
GLUCOSE SERPL-MCNC: 504 MG/DL (ref 65–99)
HBA1C MFR BLD: 14 % (ref 4.8–5.6)
POTASSIUM SERPL-SCNC: 4.9 MMOL/L (ref 3.5–5.2)
PROT SERPL-MCNC: 7.2 G/DL (ref 6–8.5)
SODIUM SERPL-SCNC: 134 MMOL/L (ref 134–144)

## 2020-03-16 ENCOUNTER — TELEPHONE (OUTPATIENT)
Dept: FAMILY MEDICINE | Facility: CLINIC | Age: 65
End: 2020-03-16

## 2020-03-16 NOTE — TELEPHONE ENCOUNTER
----- Message from Wagner Ann Jr., MD sent at 3/16/2020  9:13 AM CDT -----  Please call the patient regarding his result.  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   We need to get him controlled or he will fail his next physical.   Truthfully, insulin is the best option and is now allowed under DOT regulations but does require him to monitor sugars regularly and keep a log.  I am going to send him an oral med to start with.  Repeat labs in 3 months.

## 2020-03-16 NOTE — TELEPHONE ENCOUNTER
The following medication needs a prior authorization:     Medication Name: dapagliflozin-saxagliptin (QTERN)    Dosage: 5-5 mg     Frequency: Take 1 tablet by mouth once daily.     Directions for use: Take 1 tablet by mouth once daily.     Diagnosis: Type 2 diabetes mellitus without complication, without long-term current use of insulin  -    Is the request for a reauthorization? NO    Is the patient currently stable on therapy? NEW THERAPY    Please list all therapeutic alternatives previously used with start/end dates and outcome:  PREFERRED DRUG GLYXAMBI IS CONTRAINDICATED DUE TO KIDNEY FUNCTION AND QTERN IS APPROVED FOR HIS KIDNEYS.

## 2020-03-17 NOTE — TELEPHONE ENCOUNTER
PATIENT CALLED BACK AND WAS NOTIFIED OF THE LABS RESULTS AND MEDICATION THAT HAS BEEN SENT IN. PATIENT STATES THAT HIS DIET HAS CHANGED AND THAT HE HAS LOST SOME WEIGHT. HIS SUGAR WAS TODAY . PATIENT STATES THAT HE WILL TRY AN INSULIN BUT HE IS STILL SCARED OF THE NEEDLES, BUT HE WILL DO WHAT YOU WANT HIM TO DO.

## 2020-03-18 DIAGNOSIS — E11.9 TYPE 2 DIABETES MELLITUS WITHOUT COMPLICATION, WITHOUT LONG-TERM CURRENT USE OF INSULIN: Primary | ICD-10-CM

## 2020-03-18 NOTE — Clinical Note
I had to change the medication I wanted to use to something his insurance covers.  Once he picks up the new meds, he needs to stop the metformin

## 2020-03-19 ENCOUNTER — TELEPHONE (OUTPATIENT)
Dept: FAMILY MEDICINE | Facility: CLINIC | Age: 65
End: 2020-03-19

## 2020-03-19 NOTE — TELEPHONE ENCOUNTER
The following medication needs a prior authorization:     Medication Name: INVOKANA    Dosage: 100 MG    Frequency: Take 1 tablet (100 mg total) by mouth once daily    Directions for use: Take 1 tablet (100 mg total) by mouth once daily    Diagnosis:Type 2 diabetes mellitus without complication, without long-term current use of insulin       Is the request for a reauthorization? NEW THERAPY    Is the patient currently stable on therapy? NEW THERAPY    Please list all therapeutic alternatives previously used with start/end dates and outcome:

## 2020-05-28 DIAGNOSIS — E11.9 TYPE 2 DIABETES MELLITUS WITHOUT COMPLICATION, WITHOUT LONG-TERM CURRENT USE OF INSULIN: ICD-10-CM

## 2020-05-28 DIAGNOSIS — I25.10 CORONARY ARTERY DISEASE, ANGINA PRESENCE UNSPECIFIED, UNSPECIFIED VESSEL OR LESION TYPE, UNSPECIFIED WHETHER NATIVE OR TRANSPLANTED HEART: ICD-10-CM

## 2020-05-28 DIAGNOSIS — E78.00 PURE HYPERCHOLESTEROLEMIA: ICD-10-CM

## 2020-05-28 DIAGNOSIS — I10 ESSENTIAL HYPERTENSION: ICD-10-CM

## 2020-05-29 RX ORDER — OLMESARTAN MEDOXOMIL AND HYDROCHLOROTHIAZIDE 40/25 40; 25 MG/1; MG/1
TABLET ORAL
Qty: 90 TABLET | Refills: 0 | Status: SHIPPED | OUTPATIENT
Start: 2020-05-29 | End: 2020-07-20 | Stop reason: SDUPTHER

## 2020-05-29 RX ORDER — ATORVASTATIN CALCIUM 40 MG/1
TABLET, FILM COATED ORAL
Qty: 90 TABLET | Refills: 0 | Status: SHIPPED | OUTPATIENT
Start: 2020-05-29 | End: 2022-01-11 | Stop reason: SDUPTHER

## 2020-05-29 RX ORDER — METOPROLOL SUCCINATE 100 MG/1
TABLET, EXTENDED RELEASE ORAL
Qty: 90 TABLET | Refills: 0 | Status: SHIPPED | OUTPATIENT
Start: 2020-05-29 | End: 2020-07-20 | Stop reason: SDUPTHER

## 2020-05-29 RX ORDER — SPIRONOLACTONE 50 MG/1
TABLET, FILM COATED ORAL
Qty: 90 TABLET | Refills: 0 | Status: SHIPPED | OUTPATIENT
Start: 2020-05-29 | End: 2020-07-20 | Stop reason: SDUPTHER

## 2020-06-02 ENCOUNTER — TELEPHONE (OUTPATIENT)
Dept: FAMILY MEDICINE | Facility: CLINIC | Age: 65
End: 2020-06-02

## 2020-06-24 ENCOUNTER — TELEPHONE (OUTPATIENT)
Dept: FAMILY MEDICINE | Facility: CLINIC | Age: 65
End: 2020-06-24

## 2020-06-24 DIAGNOSIS — E11.9 TYPE 2 DIABETES MELLITUS WITHOUT COMPLICATION, WITHOUT LONG-TERM CURRENT USE OF INSULIN: Primary | ICD-10-CM

## 2020-07-08 ENCOUNTER — TELEPHONE (OUTPATIENT)
Dept: FAMILY MEDICINE | Facility: CLINIC | Age: 65
End: 2020-07-08

## 2020-07-14 LAB
ALBUMIN SERPL-MCNC: 4.5 G/DL (ref 3.8–4.8)
ALBUMIN/GLOB SERPL: 1.5 {RATIO} (ref 1.2–2.2)
ALP SERPL-CCNC: 87 IU/L (ref 39–117)
ALT SERPL-CCNC: 38 IU/L (ref 0–44)
AST SERPL-CCNC: 19 IU/L (ref 0–40)
BILIRUB SERPL-MCNC: 0.3 MG/DL (ref 0–1.2)
BUN SERPL-MCNC: 19 MG/DL (ref 8–27)
BUN/CREAT SERPL: 15 (ref 10–24)
CALCIUM SERPL-MCNC: 10.1 MG/DL (ref 8.6–10.2)
CHLORIDE SERPL-SCNC: 103 MMOL/L (ref 96–106)
CO2 SERPL-SCNC: 23 MMOL/L (ref 20–29)
CREAT SERPL-MCNC: 1.3 MG/DL (ref 0.76–1.27)
GLOBULIN SER CALC-MCNC: 3 G/DL (ref 1.5–4.5)
GLUCOSE SERPL-MCNC: 131 MG/DL (ref 65–99)
HBA1C MFR BLD: 7 % (ref 4.8–5.6)
POTASSIUM SERPL-SCNC: 4.6 MMOL/L (ref 3.5–5.2)
PROT SERPL-MCNC: 7.5 G/DL (ref 6–8.5)
SODIUM SERPL-SCNC: 140 MMOL/L (ref 134–144)

## 2020-07-20 ENCOUNTER — OFFICE VISIT (OUTPATIENT)
Dept: FAMILY MEDICINE | Facility: CLINIC | Age: 65
End: 2020-07-20
Payer: COMMERCIAL

## 2020-07-20 VITALS
OXYGEN SATURATION: 96 % | SYSTOLIC BLOOD PRESSURE: 144 MMHG | BODY MASS INDEX: 32.28 KG/M2 | WEIGHT: 213 LBS | HEIGHT: 68 IN | HEART RATE: 76 BPM | DIASTOLIC BLOOD PRESSURE: 70 MMHG | TEMPERATURE: 98 F

## 2020-07-20 DIAGNOSIS — I10 ESSENTIAL HYPERTENSION: ICD-10-CM

## 2020-07-20 DIAGNOSIS — I25.10 ATHEROSCLEROSIS OF NATIVE CORONARY ARTERY OF NATIVE HEART WITHOUT ANGINA PECTORIS: ICD-10-CM

## 2020-07-20 DIAGNOSIS — R80.9 TYPE 2 DIABETES MELLITUS WITH MICROALBUMINURIA, WITHOUT LONG-TERM CURRENT USE OF INSULIN: Primary | ICD-10-CM

## 2020-07-20 DIAGNOSIS — E78.00 PURE HYPERCHOLESTEROLEMIA: ICD-10-CM

## 2020-07-20 DIAGNOSIS — E11.29 TYPE 2 DIABETES MELLITUS WITH MICROALBUMINURIA, WITHOUT LONG-TERM CURRENT USE OF INSULIN: Primary | ICD-10-CM

## 2020-07-20 PROCEDURE — 99214 PR OFFICE/OUTPT VISIT, EST, LEVL IV, 30-39 MIN: ICD-10-PCS | Mod: S$GLB,,, | Performed by: INTERNAL MEDICINE

## 2020-07-20 PROCEDURE — 99214 OFFICE O/P EST MOD 30 MIN: CPT | Mod: S$GLB,,, | Performed by: INTERNAL MEDICINE

## 2020-07-20 RX ORDER — METFORMIN HYDROCHLORIDE 500 MG/1
500 TABLET, EXTENDED RELEASE ORAL 2 TIMES DAILY
Qty: 180 TABLET | Refills: 1 | Status: SHIPPED | OUTPATIENT
Start: 2020-07-20 | End: 2022-01-11 | Stop reason: SDUPTHER

## 2020-07-20 RX ORDER — SPIRONOLACTONE 50 MG/1
TABLET, FILM COATED ORAL
Qty: 90 TABLET | Refills: 1 | Status: SHIPPED | OUTPATIENT
Start: 2020-07-20 | End: 2022-01-11 | Stop reason: SDUPTHER

## 2020-07-20 RX ORDER — METOPROLOL SUCCINATE 100 MG/1
TABLET, EXTENDED RELEASE ORAL
Qty: 90 TABLET | Refills: 1 | Status: SHIPPED | OUTPATIENT
Start: 2020-07-20 | End: 2022-01-11 | Stop reason: SDUPTHER

## 2020-07-20 RX ORDER — OLMESARTAN MEDOXOMIL AND HYDROCHLOROTHIAZIDE 40/25 40; 25 MG/1; MG/1
1 TABLET ORAL DAILY
Qty: 90 TABLET | Refills: 1 | Status: SHIPPED | OUTPATIENT
Start: 2020-07-20 | End: 2022-01-11

## 2020-07-20 RX ORDER — METFORMIN HYDROCHLORIDE 500 MG/1
1 TABLET, EXTENDED RELEASE ORAL 2 TIMES DAILY
COMMUNITY
Start: 2020-07-14 | End: 2020-07-20 | Stop reason: SDUPTHER

## 2020-07-20 RX ORDER — AMLODIPINE BESYLATE 10 MG/1
10 TABLET ORAL NIGHTLY
Qty: 90 TABLET | Refills: 1 | Status: SHIPPED | OUTPATIENT
Start: 2020-07-20 | End: 2022-01-11 | Stop reason: SDUPTHER
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
## 2020-07-31 ENCOUNTER — TELEPHONE (OUTPATIENT)
Dept: CARDIOLOGY | Facility: HOSPITAL | Age: 65
End: 2020-07-31

## 2020-08-03 ENCOUNTER — HOSPITAL ENCOUNTER (OUTPATIENT)
Dept: RADIOLOGY | Facility: HOSPITAL | Age: 65
Discharge: HOME | End: 2020-08-03
Attending: INTERNAL MEDICINE
Payer: COMMERCIAL

## 2020-08-03 ENCOUNTER — TELEPHONE (OUTPATIENT)
Dept: FAMILY MEDICINE | Facility: CLINIC | Age: 65
End: 2020-08-03

## 2020-08-03 ENCOUNTER — CLINICAL SUPPORT (OUTPATIENT)
Dept: CARDIOLOGY | Facility: HOSPITAL | Age: 65
End: 2020-08-03
Attending: INTERNAL MEDICINE
Payer: COMMERCIAL

## 2020-08-03 VITALS — WEIGHT: 213.88 LBS | HEIGHT: 68 IN | BODY MASS INDEX: 32.41 KG/M2

## 2020-08-03 DIAGNOSIS — I25.10 ATHEROSCLEROSIS OF NATIVE CORONARY ARTERY OF NATIVE HEART WITHOUT ANGINA PECTORIS: ICD-10-CM

## 2020-08-03 LAB
CV PHARM DOSE: 0.4 MG
CV STRESS BASE HR: 59 BPM
DIASTOLIC BLOOD PRESSURE: 71 MMHG
OHS CV CPX 85 PERCENT MAX PREDICTED HEART RATE MALE: 134
OHS CV CPX MAX PREDICTED HEART RATE: 158
OHS CV CPX PATIENT IS FEMALE: 0
OHS CV CPX PATIENT IS MALE: 1
OHS CV CPX PEAK DIASTOLIC BLOOD PRESSURE: 80 MMHG
OHS CV CPX PEAK HEAR RATE: 82 BPM
OHS CV CPX PEAK RATE PRESSURE PRODUCT: NORMAL
OHS CV CPX PEAK SYSTOLIC BLOOD PRESSURE: 158 MMHG
OHS CV CPX PERCENT MAX PREDICTED HEART RATE ACHIEVED: 52
OHS CV CPX RATE PRESSURE PRODUCT PRESENTING: 8614
SYSTOLIC BLOOD PRESSURE: 146 MMHG

## 2020-08-03 PROCEDURE — 63600175 PHARM REV CODE 636 W HCPCS: Performed by: INTERNAL MEDICINE

## 2020-08-03 PROCEDURE — 93017 CV STRESS TEST TRACING ONLY: CPT

## 2020-08-03 PROCEDURE — A9502 TC99M TETROFOSMIN: HCPCS

## 2020-08-03 RX ORDER — REGADENOSON 0.08 MG/ML
0.4 INJECTION, SOLUTION INTRAVENOUS ONCE
Status: COMPLETED | OUTPATIENT
Start: 2020-08-03 | End: 2020-08-03

## 2020-08-03 RX ADMIN — REGADENOSON 0.4 MG: 0.08 INJECTION, SOLUTION INTRAVENOUS at 08:08

## 2020-08-03 NOTE — TELEPHONE ENCOUNTER
----- Message from Wagner Ann Jr., MD sent at 8/3/2020 10:47 AM CDT -----  Please call the patient regarding his result.  No evidence of blockages but overall function/pumping ability is decreased.  Recommend follow up with Cardiology at least yearly

## 2020-08-04 ENCOUNTER — TELEPHONE (OUTPATIENT)
Dept: FAMILY MEDICINE | Facility: CLINIC | Age: 65
End: 2020-08-04

## 2020-08-04 DIAGNOSIS — I25.10 ATHEROSCLEROSIS OF NATIVE CORONARY ARTERY OF NATIVE HEART WITHOUT ANGINA PECTORIS: Primary | ICD-10-CM

## 2020-08-04 DIAGNOSIS — I51.9 SYSTOLIC DYSFUNCTION: ICD-10-CM

## 2020-08-04 NOTE — TELEPHONE ENCOUNTER
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

## 2020-08-04 NOTE — TELEPHONE ENCOUNTER
Pt states he went to [facility] today but in the chart it says cancelled but someone there told him he needs to do the second part of the stress test because of something abnormal. Again there are no notes showing he was even seen. He just wants to hear from you again what you think?. He was notified yesterday of his stress test results. Thank you!

## 2020-08-05 ENCOUNTER — TELEPHONE (OUTPATIENT)
Dept: FAMILY MEDICINE | Facility: CLINIC | Age: 65
End: 2020-08-05

## 2020-08-05 DIAGNOSIS — I25.10 ATHEROSCLEROSIS OF NATIVE CORONARY ARTERY OF NATIVE HEART WITHOUT ANGINA PECTORIS: ICD-10-CM

## 2020-08-05 DIAGNOSIS — I51.9 SYSTOLIC DYSFUNCTION: Primary | ICD-10-CM

## 2020-08-05 NOTE — TELEPHONE ENCOUNTER
Notified pt regarding cardiology referral. 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 Please advise, thank you!

## 2020-08-17 ENCOUNTER — CLINICAL SUPPORT (OUTPATIENT)
Dept: CARDIOLOGY | Facility: HOSPITAL | Age: 65
End: 2020-08-17
Attending: INTERNAL MEDICINE
Payer: COMMERCIAL

## 2020-08-17 PROCEDURE — 93306 TTE W/DOPPLER COMPLETE: CPT

## 2020-08-18 ENCOUNTER — TELEPHONE (OUTPATIENT)
Dept: FAMILY MEDICINE | Facility: CLINIC | Age: 65
End: 2020-08-18

## 2020-08-18 LAB
AORTIC ROOT ANNULUS: 3.06 CM
AORTIC VALVE CUSP SEPERATION: 2.06 CM
AV INDEX (PROSTH): 0.7
AV MEAN GRADIENT: 5 MMHG
AV PEAK GRADIENT: 8 MMHG
AV VALVE AREA: 2.32 CM2
AV VELOCITY RATIO: 74.53
CV ECHO LV RWT: 0.39 CM
DOP CALC AO PEAK VEL: 1.43 M/S
DOP CALC AO VTI: 30.69 CM
DOP CALC LVOT AREA: 3.3 CM2
DOP CALC LVOT DIAMETER: 2.05 CM
DOP CALC LVOT PEAK VEL: 106.58 M/S
DOP CALC LVOT STROKE VOLUME: 71.32 CM3
DOP CALCLVOT PEAK VEL VTI: 21.62 CM
E WAVE DECELERATION TIME: 276.76 MSEC
E/A RATIO: 1.59
E/E' RATIO: 7.74 M/S
ECHO LV POSTERIOR WALL: 1.15 CM (ref 0.6–1.1)
FRACTIONAL SHORTENING: 16 % (ref 28–44)
INTERVENTRICULAR SEPTUM: 1.14 CM (ref 0.6–1.1)
LEFT ATRIUM SIZE: 3.48 CM
LEFT INTERNAL DIMENSION IN SYSTOLE: 4.88 CM (ref 2.1–4)
LEFT VENTRICLE DIASTOLIC VOLUME: 124.81 ML
LEFT VENTRICLE SYSTOLIC VOLUME: 58.91 ML
LEFT VENTRICULAR INTERNAL DIMENSION IN DIASTOLE: 5.84 CM (ref 3.5–6)
LEFT VENTRICULAR MASS: 281.97 G
LV LATERAL E/E' RATIO: 6.85 M/S
LV SEPTAL E/E' RATIO: 8.9 M/S
MV PEAK A VEL: 0.56 M/S
MV PEAK E VEL: 0.89 M/S
PISA TR MAX VEL: 2.62 M/S
PV PEAK VELOCITY: 146.67 CM/S
RA PRESSURE: 8 MMHG
TDI LATERAL: 0.13 M/S
TDI SEPTAL: 0.1 M/S
TDI: 0.12 M/S
TR MAX PG: 27 MMHG
TV REST PULMONARY ARTERY PRESSURE: 35 MMHG

## 2020-08-18 NOTE — TELEPHONE ENCOUNTER
----- Message from Wagner Ann Jr., MD sent at 8/18/2020 12:01 PM CDT -----  Please call the patient regarding his result.  His ECHO shows an EF of 50% which is acceptable per DOT regulations.  He may need to sign up for the portal so he can print a copy

## 2020-08-20 ENCOUNTER — TELEPHONE (OUTPATIENT)
Dept: FAMILY MEDICINE | Facility: CLINIC | Age: 65
End: 2020-08-20

## 2020-08-20 ENCOUNTER — OFFICE VISIT (OUTPATIENT)
Dept: FAMILY MEDICINE | Facility: CLINIC | Age: 65
End: 2020-08-20

## 2020-08-20 VITALS
SYSTOLIC BLOOD PRESSURE: 120 MMHG | TEMPERATURE: 98 F | BODY MASS INDEX: 32.43 KG/M2 | HEART RATE: 62 BPM | DIASTOLIC BLOOD PRESSURE: 60 MMHG | HEIGHT: 68 IN | WEIGHT: 214 LBS | OXYGEN SATURATION: 96 %

## 2020-08-20 DIAGNOSIS — Z02.4 ENCOUNTER FOR DRIVER'S LICENSE HISTORY AND PHYSICAL: Primary | ICD-10-CM

## 2020-08-20 DIAGNOSIS — I25.10 CORONARY ARTERY DISEASE, ANGINA PRESENCE UNSPECIFIED, UNSPECIFIED VESSEL OR LESION TYPE, UNSPECIFIED WHETHER NATIVE OR TRANSPLANTED HEART: ICD-10-CM

## 2020-08-20 LAB
BILIRUB SERPL-MCNC: NORMAL MG/DL
BLOOD, POC UA: NORMAL
GLUCOSE UR QL STRIP: NORMAL
KETONES UR QL STRIP: NORMAL
LEUKOCYTE ESTERASE URINE, POC: NORMAL
NITRITE, POC UA: NORMAL
PH, POC UA: 6.5
PROTEIN, POC: NORMAL
SPECIFIC GRAVITY, POC UA: 1.01
UROBILINOGEN, POC UA: NORMAL

## 2020-08-20 PROCEDURE — 99499 UNLISTED E&M SERVICE: CPT | Mod: 25,S$GLB,, | Performed by: INTERNAL MEDICINE

## 2020-08-20 PROCEDURE — 99499 PR PHYSICAL - DOT/CDL: ICD-10-PCS | Mod: 25,S$GLB,, | Performed by: INTERNAL MEDICINE

## 2020-08-20 NOTE — TELEPHONE ENCOUNTER
Pt states [facility] will not take Echo. Pt's job wants paperwork by tomorrow. Appointment scheduled today at 4:40pm per Dr. Ann. Pt notified.

## 2020-08-20 NOTE — LETTER
August 20, 2020    Kash Salomon  [address] [place] [address]             [facility] - Service  Family / Internal Medicine  [address]  [place] [address]  Phone: [phone]  Fax: [phone] To Whom It May Concern:    Mr. Salomon is been under my care for Diabetes, HTN, CAD s/p CABG in 2015 and is seeing me for a DOT physical.  He recently underwent a nuclear stress test which was negative for ischemia but had an estimated ejection fraction of 33% which is below the threshold of 40% specified by the [employer].  However, this same report specified that there were no wall motion abnormalities which does not correlate with an EF of 33%.  As he does not exhibit symptoms of CHF that would be c/w and EF of 33%, I ordered an ECHO which directly visualizes blood flow in real time.  This had an EF of 50% which correlates with the lack of wall motion abnormalities and symptoms.  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      Sincerely,        Wagner Ann Jr. MD

## 2020-08-20 NOTE — PROGRESS NOTES
Subjective:       Patient ID: Kash Salomon is a 64 y.o. male.    Chief Complaint: Annual Exam (dot physical) and vision (uncorrected vision both eyes 20/20, L/R 20/20)    Here for DOT physical.  He originally went to [facility] but they would not do his physical d/t his nuclear stress test results showing a low EF of 33% despite there being no wall motion abnormalities.  I subsequently did an ECHO which showed an EF of 50%.  However, that did not satisfy them.  He does have f/u with Cardiology next month.  He is not symptomatic; no leg swelling, orthopnea, PND, MCINTYRE with normal activity.  The only time he will get some SOB/MCINTYRE if he has to heather the goats in his yard and even then estimates he can run a few hundred feet before getting SOB.      Review of Systems   Constitutional: Negative for chills, fatigue, fever and unexpected weight change.   HENT: Negative for congestion, hearing loss, postnasal drip, rhinorrhea, trouble swallowing and voice change.    Eyes: Negative for photophobia and visual disturbance.   Respiratory: Negative for apnea, cough, choking, chest tightness, shortness of breath and wheezing.    Cardiovascular: Negative for chest pain, palpitations and leg swelling.   Gastrointestinal: Negative for abdominal pain, blood in stool, constipation, diarrhea, nausea, rectal pain and vomiting.   Endocrine: Negative for cold intolerance, heat intolerance, polydipsia and polyuria.   Genitourinary: Negative for decreased urine volume, difficulty urinating, discharge, dysuria, flank pain, frequency, genital sores, hematuria, testicular pain and urgency.   Musculoskeletal: Negative for arthralgias, back pain, gait problem, joint swelling, myalgias and neck pain.   Skin: Negative for color change, rash and wound.   Allergic/Immunologic: Negative for environmental allergies and food allergies.   Neurological: Negative for dizziness, tremors, seizures, syncope, facial asymmetry, speech difficulty,  weakness, light-headedness, numbness and headaches.   Hematological: Negative for adenopathy. Does not bruise/bleed easily.   Psychiatric/Behavioral: Negative for confusion, hallucinations, sleep disturbance and suicidal ideas. The patient is not nervous/anxious.        Past Medical History:   Diagnosis Date    Chronic ischemic heart disease     Cyst, pilonidal, with abscess     Diabetes mellitus, type 2     Hyperlipidemia     Hypertension     Raised prostate specific antigen     Systolic dysfunction     EF 33% on nuclear stress      Past Surgical History:   Procedure Laterality Date    CORONARY ARTERY BYPASS GRAFT  05/2015    HERNIA REPAIR      right elbow         Family History   Problem Relation Age of Onset    Osteoporosis Mother     Arthritis Mother     Cancer Father     Scoliosis Daughter        Social History     Socioeconomic History    Marital status:      Spouse name: Not on file    Number of children: Not on file    Years of education: Not on file    Highest education level: Not on file   Occupational History    Occupation:    Social Needs    Financial resource strain: Not on file    Food insecurity     Worry: Not on file     Inability: Not on file    Transportation needs     Medical: Not on file     Non-medical: Not on file   Tobacco Use    Smoking status: Current Every Day Smoker    Smokeless tobacco: Never Used    Tobacco comment: 1 pack per week   Substance and Sexual Activity    Alcohol use: No    Drug use: No    Sexual activity: Yes     Partners: Female     Birth control/protection: None   Lifestyle    Physical activity     Days per week: Not on file     Minutes per session: Not on file    Stress: Not at all   Relationships    Social connections     Talks on phone: Not on file     Gets together: Not on file     Attends [employer] witness service: Not on file     Active member of club or organization: Not on file     Attends meetings of clubs or organizations:  "Not on file     Relationship status: Not on file   Other Topics Concern    Not on file   Social History Narrative    LIVE WITH WIFE       Current Outpatient Medications   Medication Sig Dispense Refill    amLODIPine (NORVASC) 10 MG tablet Take 1 tablet (10 mg total) by mouth every evening. 90 tablet 1    antiox. no.10-omeg3s-lut-isha (I-CAPS) 280-10-2 mg Cap Take 2 capsules by mouth once daily.      aspirin 325 MG tablet Take 325 mg by mouth once daily.      atorvastatin (LIPITOR) 40 MG tablet TAKE 1 TABLET(40 MG) BY MOUTH EVERY DAY 90 tablet 0    fish oil-omega-3 fatty acids 300-1,000 mg capsule Take 2 capsules by mouth once daily.      metFORMIN (GLUCOPHAGE-XR) 500 MG XR 24hr tablet Take 1 tablet (500 mg total) by mouth 2 (two) times a day. 180 tablet 1    metoprolol succinate (TOPROL-XL) 100 MG 24 hr tablet TAKE 1 TABLET(100 MG) BY MOUTH EVERY DAY 90 tablet 1    olmesartan-hydrochlorothiazide (BENICAR HCT) 40-25 mg per tablet Take 1 tablet by mouth once daily. 90 tablet 1    spironolactone (ALDACTONE) 50 MG tablet TAKE 1 TABLET(50 MG) BY MOUTH EVERY DAY 90 tablet 1     No current facility-administered medications for this visit.        Review of patient's allergies indicates:  No Known Allergies  Objective:    [facility]     Annual Exam      Additional comments: dot physical              vision      Additional comments: uncorrected vision both eyes 20/20, L/R 20/20          Last edited by Radha Zuniga MA on 8/20/2020  4:54 PM. (History)      Blood pressure 120/60, pulse 62, temperature 98.2 °F (36.8 °C), temperature source Temporal, height 5' 8" (1.727 m), weight 97.1 kg (214 lb), SpO2 96 %. Body mass index is 32.54 kg/m².   Physical Exam  Vitals signs and nursing note reviewed.   Constitutional:       General: He is not in acute distress.     Appearance: He is well-developed. He is not ill-appearing, toxic-appearing or diaphoretic.   HENT:      Head: Normocephalic and atraumatic.      Right Ear: No " decreased hearing (forced whisper) noted.      Left Ear: No decreased hearing noted.   Eyes:      General: Vision grossly intact. No scleral icterus.        Right eye: No discharge.         Left eye: No discharge.      Extraocular Movements: Extraocular movements intact.      Conjunctiva/sclera: Conjunctivae normal.      Comments: Peripheral vision >=70 degrees   Neck:      Vascular: No carotid bruit.   Cardiovascular:      Rate and Rhythm: Normal rate and regular rhythm.      Heart sounds: Normal heart sounds. No murmur.   Pulmonary:      Effort: Pulmonary effort is normal. No respiratory distress.      Breath sounds: Normal breath sounds. No decreased breath sounds, wheezing, rhonchi or rales.   Abdominal:      General: There is no distension.      Palpations: Abdomen is soft.      Tenderness: There is no abdominal tenderness. There is no guarding or rebound.   Musculoskeletal:      Right lower leg: No edema.      Left lower leg: No edema.   Skin:     General: Skin is warm and dry.   Neurological:      Mental Status: He is alert.      Motor: No tremor.   Psychiatric:         Mood and Affect: Mood normal.         Speech: Speech normal.         Behavior: Behavior normal.             Assessment:       1. Encounter for 's license history and physical    2. Coronary artery disease, angina presence unspecified, unspecified vessel or lesion type, unspecified whether native or transplanted heart        Plan:       Kash was seen today for annual exam and vision.    Diagnoses and all orders for this visit:    Encounter for 's license history and physical  Comments:  I believe the ECHO is more c/w his clinical picture and thus [facility]  One year DOT card  Orders:  -     POCT Urinalysis    Coronary artery disease, angina presence unspecified, unspecified vessel or lesion type, unspecified whether native or transplanted heart  Comments:  Keep f/u with Cards.

## 2020-10-13 ENCOUNTER — TELEPHONE (OUTPATIENT)
Dept: FAMILY MEDICINE | Facility: CLINIC | Age: 65
End: 2020-10-13

## 2022-01-11 ENCOUNTER — OFFICE VISIT (OUTPATIENT)
Dept: FAMILY MEDICINE | Facility: CLINIC | Age: 67
End: 2022-01-11
Payer: COMMERCIAL

## 2022-01-11 VITALS
SYSTOLIC BLOOD PRESSURE: 166 MMHG | HEART RATE: 86 BPM | WEIGHT: 213 LBS | HEIGHT: 68 IN | TEMPERATURE: 97 F | DIASTOLIC BLOOD PRESSURE: 76 MMHG | OXYGEN SATURATION: 97 % | BODY MASS INDEX: 32.28 KG/M2

## 2022-01-11 DIAGNOSIS — I10 ESSENTIAL HYPERTENSION: ICD-10-CM

## 2022-01-11 DIAGNOSIS — E78.00 PURE HYPERCHOLESTEROLEMIA: ICD-10-CM

## 2022-01-11 DIAGNOSIS — E11.65 TYPE 2 DIABETES MELLITUS WITH HYPERGLYCEMIA, WITH LONG-TERM CURRENT USE OF INSULIN: Primary | ICD-10-CM

## 2022-01-11 DIAGNOSIS — Z79.4 TYPE 2 DIABETES MELLITUS WITH HYPERGLYCEMIA, WITH LONG-TERM CURRENT USE OF INSULIN: Primary | ICD-10-CM

## 2022-01-11 DIAGNOSIS — N49.3 FOURNIER'S GANGRENE OF SCROTUM: ICD-10-CM

## 2022-01-11 PROBLEM — E78.2 MIXED HYPERLIPIDEMIA: Status: ACTIVE | Noted: 2018-03-12

## 2022-01-11 PROBLEM — I25.9 CHRONIC ISCHEMIC HEART DISEASE: Status: ACTIVE | Noted: 2022-01-06

## 2022-01-11 PROBLEM — E66.09 CLASS 1 OBESITY DUE TO EXCESS CALORIES WITH SERIOUS COMORBIDITY AND BODY MASS INDEX (BMI) OF 31.0 TO 31.9 IN ADULT: Status: ACTIVE | Noted: 2021-12-25

## 2022-01-11 PROCEDURE — 99214 OFFICE O/P EST MOD 30 MIN: CPT | Mod: S$GLB,,, | Performed by: INTERNAL MEDICINE

## 2022-01-11 PROCEDURE — 99214 PR OFFICE/OUTPT VISIT, EST, LEVL IV, 30-39 MIN: ICD-10-PCS | Mod: S$GLB,,, | Performed by: INTERNAL MEDICINE

## 2022-01-11 RX ORDER — METFORMIN HYDROCHLORIDE 500 MG/1
500 TABLET, EXTENDED RELEASE ORAL 2 TIMES DAILY
Qty: 180 TABLET | Refills: 1 | Status: SHIPPED | OUTPATIENT
Start: 2022-01-11

## 2022-01-11 RX ORDER — AMOXICILLIN AND CLAVULANATE POTASSIUM 875; 125 MG/1; MG/1
1 TABLET, FILM COATED ORAL 2 TIMES DAILY
COMMUNITY
Start: 2022-01-07 | End: 2022-01-14

## 2022-01-11 RX ORDER — AMLODIPINE BESYLATE 10 MG/1
10 TABLET ORAL NIGHTLY
Qty: 90 TABLET | Refills: 1 | Status: SHIPPED | OUTPATIENT
Start: 2022-01-11

## 2022-01-11 RX ORDER — ATORVASTATIN CALCIUM 40 MG/1
40 TABLET, FILM COATED ORAL DAILY
Qty: 90 TABLET | Refills: 1 | Status: SHIPPED | OUTPATIENT
Start: 2022-01-11

## 2022-01-11 RX ORDER — OLMESARTAN MEDOXOMIL AND HYDROCHLOROTHIAZIDE 40/25 40; 25 MG/1; MG/1
1 TABLET ORAL DAILY
Qty: 90 TABLET | Refills: 1 | Status: CANCELLED | OUTPATIENT
Start: 2022-01-11

## 2022-01-11 RX ORDER — VALSARTAN AND HYDROCHLOROTHIAZIDE 160; 25 MG/1; MG/1
1 TABLET ORAL DAILY
Qty: 90 TABLET | Refills: 1 | Status: SHIPPED | OUTPATIENT
Start: 2022-01-11 | End: 2023-01-11

## 2022-01-11 RX ORDER — INSULIN DETEMIR 100 [IU]/ML
16 INJECTION, SOLUTION SUBCUTANEOUS NIGHTLY
COMMUNITY
Start: 2021-12-28

## 2022-01-11 RX ORDER — SPIRONOLACTONE 50 MG/1
TABLET, FILM COATED ORAL
Qty: 90 TABLET | Refills: 1 | Status: SHIPPED | OUTPATIENT
Start: 2022-01-11

## 2022-01-11 RX ORDER — METOPROLOL SUCCINATE 100 MG/1
TABLET, EXTENDED RELEASE ORAL
Qty: 90 TABLET | Refills: 1 | Status: SHIPPED | OUTPATIENT
Start: 2022-01-11
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
